# Patient Record
Sex: FEMALE | URBAN - METROPOLITAN AREA
[De-identification: names, ages, dates, MRNs, and addresses within clinical notes are randomized per-mention and may not be internally consistent; named-entity substitution may affect disease eponyms.]

---

## 2017-02-09 ENCOUNTER — IMPORTED ENCOUNTER (OUTPATIENT)
Dept: URBAN - METROPOLITAN AREA CLINIC 1 | Facility: CLINIC | Age: 82
End: 2017-02-09

## 2017-02-09 PROBLEM — H04.123: Noted: 2017-02-09

## 2017-02-09 PROBLEM — H16.143: Noted: 2017-02-09

## 2017-02-09 PROBLEM — Z96.1: Noted: 2017-02-09

## 2017-02-09 PROBLEM — H43.811: Noted: 2017-02-09

## 2017-02-09 PROBLEM — H40.013: Noted: 2017-02-09

## 2017-02-09 PROBLEM — Z79.4: Noted: 2017-02-09

## 2017-02-09 PROBLEM — E11.9: Noted: 2017-02-09

## 2017-02-09 PROCEDURE — 92014 COMPRE OPH EXAM EST PT 1/>: CPT

## 2017-02-09 PROCEDURE — 92015 DETERMINE REFRACTIVE STATE: CPT

## 2017-02-09 PROCEDURE — 92133 CPTRZD OPH DX IMG PST SGM ON: CPT

## 2017-02-09 NOTE — PATIENT DISCUSSION
1.  DM Type II (Taking Insulin). without sign of diabetic retinopathy and no blot heme on dilated retinal examination today OU No Macular Edema:  Discussed the pathophysiology of diabetes and its effect on the eye and risk of blindness. Stressed the importance of strong glucose control. Advised of importance of at least yearly dilated examinations but to contact us immediately for any problems or concerns. 2. AIMEE w/ PEK OU- Pt symptomatic. Inserted Med Parasol Plug RLL Small Parasol Plug LLL; Silicone Plug RLL and LLL:  Risks benefits and alternatives to placing plug was discussed with patient. Patient understands and would like to proceed. Consent was signed. Post op instructions were discussed/given to patient and patient was advised to call our office if any problems arose. Cont ATs TID OU Routinely. Consider Restasis w/o improvement. 3.  PVD w/o Tear OD - stable RD precautions. 4.  Glaucoma Suspect OU : (CD 0.65/0.6) Neg Fm Hx. AA Risk of cupping. OCT shows no progression. IOP stable on no meds. Cont to observe on no meds. Patient is considered Low Risk. 5.  Pseudophakia OU - (Toric OU) H/o LenSx OU Letter to PCP Return for an appointment in 6 mo 10 (check K) with Dr. Gamaliel Henson.

## 2017-08-10 ENCOUNTER — IMPORTED ENCOUNTER (OUTPATIENT)
Dept: URBAN - METROPOLITAN AREA CLINIC 1 | Facility: CLINIC | Age: 82
End: 2017-08-10

## 2017-08-10 PROBLEM — H04.123: Noted: 2017-08-10

## 2017-08-10 PROBLEM — H40.013: Noted: 2017-08-10

## 2017-08-10 PROBLEM — Z79.4: Noted: 2017-08-10

## 2017-08-10 PROBLEM — E11.9: Noted: 2017-08-10

## 2017-08-10 PROBLEM — Z96.1: Noted: 2017-08-10

## 2017-08-10 PROBLEM — H16.143: Noted: 2017-08-10

## 2017-08-10 PROBLEM — H43.811: Noted: 2017-08-10

## 2017-08-10 PROCEDURE — 92012 INTRM OPH EXAM EST PATIENT: CPT

## 2017-08-10 NOTE — PATIENT DISCUSSION
1.  AIMEE w/ increased PEK OU- (H/o Plugs LLs OU) Increase ATs to QID OU Routinely. Discussed option of starting Restasis. Pt wishes to hold on starting Restasis. Consider starting Restasis if pt worsens. 2.  DM Type II (On Insulin) without sign of diabetic retinopathy and no blot heme on dilated retinal examination today OU No Macular Edema:  Discussed the pathophysiology of diabetes and its effect on the eye and risk of blindness. Stressed the importance of strong glucose control. Advised of importance of at least yearly dilated examinations but to contact us immediately for any problems or concerns. 3. PVD w/o Tear OD - RD precautions. 4.  Glaucoma Suspect OU : (CD 0.65/0.6) Neg Fm Hx. AA Risk of cupping. Past w/u negative. IOP stable on no meds. Cont to observe on no meds. Patient is considered Low Risk. 5.  Pseudophakia OU - (Toric OU) H/o LenSx OUReturn for an appointment in 6 mo 30 OCT with Dr. Yuriy Blake.

## 2022-04-02 ASSESSMENT — TONOMETRY
OS_IOP_MMHG: 15
OS_IOP_MMHG: 15
OD_IOP_MMHG: 15
OD_IOP_MMHG: 16

## 2022-04-02 ASSESSMENT — VISUAL ACUITY
OS_CC: 20/40
OD_CC: 20/20-1
OS_CC: 20/25
OD_CC: 20/20-2